# Patient Record
Sex: MALE
[De-identification: names, ages, dates, MRNs, and addresses within clinical notes are randomized per-mention and may not be internally consistent; named-entity substitution may affect disease eponyms.]

---

## 2018-05-01 ENCOUNTER — HOSPITAL ENCOUNTER (EMERGENCY)
Dept: HOSPITAL 14 - H.ER | Age: 28
LOS: 1 days | Discharge: HOME | End: 2018-05-02
Payer: SELF-PAY

## 2018-05-01 DIAGNOSIS — S00.03XA: Primary | ICD-10-CM

## 2018-05-01 DIAGNOSIS — S99.922A: ICD-10-CM

## 2018-05-01 DIAGNOSIS — S69.92XA: ICD-10-CM
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
## 2018-05-01 DIAGNOSIS — M54.5: ICD-10-CM

## 2018-05-01 RX ADMIN — CLOSTRIDIUM TETANI TOXOID ANTIGEN (FORMALDEHYDE INACTIVATED), CORYNEBACTERIUM DIPHTHERIAE TOXOID ANTIGEN (FORMALDEHYDE INACTIVATED), BORDETELLA PERTUSSIS TOXOID ANTIGEN (GLUTARALDEHYDE INACTIVATED), BORDETELLA PERTUSSIS FILAMENTOUS HEMAGGLUTININ ANTIGEN (FORMALDEHYDE INACTIVATED), BORDETELLA PERTUSSIS PERTACTIN ANTIGEN, AND BORDETELLA PERTUSSIS FIMBRIAE 2/3 ANTIGEN ONE ML: 5; 2; 2.5; 5; 3; 5 INJECTION, SUSPENSION INTRAMUSCULAR at 23:27

## 2018-05-01 NOTE — CT
EXAM:

  CT Abdomen and Pelvis Without Intravenous Contrast



EXAM DATE/TIME:

  5/1/2018 9:55 PM



CLINICAL HISTORY:

  28 years old, male; Pain; Abdominal pain; Flank; Left; Additional info: Fall 

left sided pain



TECHNIQUE:

  Axial computed tomography images of the abdomen and pelvis without 

intravenous contrast.  All CT scans at this facility use one or more dose 

reduction techniques, viz.: automated exposure control; ma/kV adjustment per 

patient size (including targeted exams where dose is matched to indication; 

i.e. head); or iterative reconstruction technique.

  Coronal and sagittal reformatted images were created and reviewed.



COMPARISON:

  There are no prior studies for comparison.



FINDINGS:

  Lung bases:  See below.  

  Heart:  Heart size is normal. Lung bases are clear. There is a small hiatal 

hernia.



 ABDOMEN:

  Liver:  unremarkable

  Gallbladder and bile ducts:  unremarkable

  Pancreas:  unremarkable

  Spleen:  unremarkable

  Adrenals:  unremarkable

  Kidneys and ureters:  Kidneys and ureters are unremarkable. There are no 

renal or ureteral stones.

  Stomach and bowel:  The stomach is incompletely distended. Rotation is 

normal. There is fluid and air throughout the small bowel. There is no 

obstruction. Ileocecal region is unremarkable. Appendix and terminal ileum are 

unremarkable. Colon is incompletely distended which limits evaluation.



 PELVIS:

  Appendix:  See stomach and bowel

  Bladder:  unremarkable

  Reproductive:  Seminal vesicles and prostate are unremarkable.



 ABDOMEN and PELVIS:

  Intraperitoneal space:  There is no free air or free fluid.

  Bones/joints:  There are no acute displaced rib fractures. There is minimal 

wedging at L1.

  Soft tissues:  There is a small fat containing umbilical hernia. There is 

bruising and edema in the left buttock. There is bruising/edema in the soft 

tissues of the left low back.

  Vasculature:  Vascular structures are unremarkable.

  Lymph nodes:  There is shotty mesenteric adenopathy.



IMPRESSION:  Slightly limited evaluation of the abdomen and pelvis due to lack 

of intravenous contrast, no acute solid visceral or bowel injury; no renal or 

ureteral stones or hydronephrosis; shotty mesenteric adenopathy; minimal age 

indeterminate wedging at L1; bruising/edema in the soft tissues of the left 

lower back and left buttock



Additional nonemergent findings as described above.

## 2018-05-01 NOTE — ED PDOC
HPI: Trauma/Fall





- HPI


Time Seen by Provider: 05/01/18 21:24


Chief Complaint (Nursing): Trauma


Chief Complaint (Provider): Trauma


History Per: Patient


History/Exam Limitations: no limitations


Onset/Duration Of Symptoms: Mins (prior to arrival)


Additional Complaint(s): 


Rob Dennis is a 28 year old male with no past medical history who is 

presenting to the ER with complaints of headaches, dizziness and left lower 

back pain, s/p fall down stairs prior to arrival. Patient also reports right 

arm pain, and left heel pain. He denies any loss of consciousness, nausea, or 

vomiting. Patient offers no other medical complaints at this time. 





PMD: Doctor, Conversion











Past Medical History


Reviewed: Historical Data, Nursing Documentation, Vital Signs





- Medical History


PMH: No Chronic Diseases





- Surgical History


Surgical History: No Surg Hx





- Family History


Family History: States: Unknown Family Hx





- Social History


Current smoker - smoking cessation education provided: No


Alcohol: None


Drugs: Denies





- Allergies


Allergies/Adverse Reactions: 


 Allergies











Allergy/AdvReac Type Severity Reaction Status Date / Time


 


No Known Allergies Allergy   Verified 05/01/18 21:18














Review of Systems


ROS Statement: Except As Marked, All Systems Reviewed And Found Negative


Gastrointestinal: Negative for: Nausea, Vomiting


Musculoskeletal: Positive for: Arm Pain, Back Pain, Foot Pain


Neurological: Positive for: Headache, Dizziness.  Negative for: Other (loss of 

consciousness)





Physical Exam





- Reviewed


Nursing Documentation Reviewed: Yes


Vital Signs Reviewed: Yes





- Physical Exam


Appears: Positive for: Non-toxic, No Acute Distress


Head Exam: Positive for: ATRAUMATIC, NORMOCEPHALIC.  Negative for: NORMAL 

INSPECTION ((-) abrasion to head)


Skin: Positive for: Normal Color


Neck: Positive for: Normal, Painless ROM


Gastrointestinal/Abdominal: Positive for: Normal Exam, Tenderness (left lower 

side tenderness)


Back: Positive for: Other (left lower back tenderness)


Extremity: Positive for: Tenderness (left heel tenderness), Other (abrasion to 

left hand).  Negative for: Normal ROM ((+) decreased ROM due to pain in left 

hand)


Neurologic/Psych: Positive for: Alert, Oriented.  Negative for: Motor/Sensory 

Deficits





Medical Decision Making


Medical Decision Making: 


Time: 21:55


Plan:


--CT Abd/Pelvis


--CT Head


--X-Ray Left Hand


--X-Ray Left Heel





Wound irrigated. Antibiotic ointment applied. 





head CT and abdomen without acute abnormalities. 





Heel xr-ay without acute fracture or dislocation. 


Hand x-ray without acute fracture or dislocation. 





--------------------------------------------------------------------------------

---------------------------------------


Scribe Attestation:


Documented by Katei Kendall, acting as a scribe for Heydi Casey PA-C.





Provider Scribe Attestation:


All medical record entries made by the Scribe were at my direction and 

personally dictated by me. I have reviewed the chart and agree that the record 

accurately reflects my personal performance of the history, physical exam, 

medical decision making, and the department course for this patient. I have 

also personally directed, reviewed, and agree with the discharge instructions 

and disposition











Disposition





- Clinical Impression


Clinical Impression: 


 Fall, Hand injury, Heel pain, Lower back pain, Headache, Abrasions of multiple 

sites








- Patient ED Disposition


Is Patient to be Admitted: No


Counseled Patient/Family Regarding: Diagnosis, Need For Followup, Rx Given





- Disposition


Disposition: Routine/Home


Disposition Time: 23:24


Condition: STABLE


Instructions:  Skin Abrasions (DC)


Forms:  [facility] (English)

## 2018-05-01 NOTE — CT
EXAM:

  CT Head Without Intravenous Contrast



EXAM DATE/TIME:

  5/1/2018 9:55 PM



CLINICAL HISTORY:

  28 years old, male; Injury or trauma; Fall; Initial encounter; Abrasion and 

concussion / head injury; Consciousness not specified; Forehead; Injury 

details: Fell down stairs; Additional info: Headache, dizziness after fall



TECHNIQUE:

  Axial computed tomography images of the head/brain without intravenous 

contrast.  All CT scans at this facility use one or more dose reduction 

techniques, viz.: automated exposure control; ma/kV adjustment per patient size 

(including targeted exams where dose is matched to indication; i.e. head); or 

iterative reconstruction technique.

  Coronal and sagittal reformatted images were created and reviewed.



COMPARISON:

  There are no prior studies for comparison.



FINDINGS:

  Artifacts:  Streak artifact degrades image quality.

  Brain and Ventricles:  Ventricles are normal in size and configuration. There 

is no midline shift. There are no intra-axial or extra-axial mass lesions or 

areas of hemorrhage. There are no abnormal fluid collections. Gray-white 

differentiation is maintained.

  Bones: Cranial vault is intact.

  Soft tissues:  There is right frontal scalp bruising.

  Sinuses:  There is frontal, ethmoid and sphenoid sinusitis. There is minimal 

mucoperiosteal thickening in the maxillary sinuses.

  Ears and mastoids: Middle ears and mastoids are unremarkable

  Orbits:  Orbital contents are unremarkable.



IMPRESSION:  Right frontal scalp bruising, no acute intracranial abnormality; 

sinusitis

## 2018-05-02 VITALS
RESPIRATION RATE: 18 BRPM | HEART RATE: 82 BPM | DIASTOLIC BLOOD PRESSURE: 63 MMHG | SYSTOLIC BLOOD PRESSURE: 117 MMHG | TEMPERATURE: 98 F | OXYGEN SATURATION: 100 %

## 2018-05-02 NOTE — RAD
PROCEDURE:  BILATERAL HEELS RADIOGRAPHS



HISTORY:

pain after fall



COMPARISON:

None available.



TECHNIQUE:

Two views of each heel have been submitted for interpretation.



FINDINGS:

An impacted comminuted fracture of the posterior base of the right 

calcaneus is suspected.  This can be better defined by MRI or CT. 

None is seen at the left.  No subluxation or dislocation.  No 

destructive bony lesion appreciable.  Local soft tissues appear 

unremarkable bilaterally.



IMPRESSION:

Impacted comminuted fractures suspected right calcaneal base 

posteriorly. No evidence to suggest left calcaneal fracture. 

Follow-up CT or MRI are available for additional characterization if 

clinically warranted.

## 2018-05-02 NOTE — RAD
PROCEDURE:  Left Hand Radiographs.



HISTORY:

pain s/p fall  



COMPARISON:

None.



FINDINGS:



BONES:

No acute fracture or destructive bony lesion identified.



JOINTS:

Normal. No osteoarthritic changes. 



SOFT TISSUES:

Normal. 



OTHER FINDINGS:

None.



IMPRESSION:

Unremarkable left hand radiographs.